# Patient Record
Sex: MALE | Race: WHITE
[De-identification: names, ages, dates, MRNs, and addresses within clinical notes are randomized per-mention and may not be internally consistent; named-entity substitution may affect disease eponyms.]

---

## 2018-01-13 ENCOUNTER — HOSPITAL ENCOUNTER (EMERGENCY)
Dept: HOSPITAL 17 - NEPE | Age: 28
Discharge: HOME | End: 2018-01-13
Payer: COMMERCIAL

## 2018-01-13 VITALS
OXYGEN SATURATION: 97 % | SYSTOLIC BLOOD PRESSURE: 138 MMHG | DIASTOLIC BLOOD PRESSURE: 87 MMHG | HEART RATE: 85 BPM | TEMPERATURE: 98.1 F | RESPIRATION RATE: 16 BRPM

## 2018-01-13 VITALS — OXYGEN SATURATION: 95 %

## 2018-01-13 DIAGNOSIS — K29.00: Primary | ICD-10-CM

## 2018-01-13 LAB
ALBUMIN SERPL-MCNC: 4.8 GM/DL (ref 3.4–5)
ALP SERPL-CCNC: 66 U/L (ref 45–117)
ALT SERPL-CCNC: 36 U/L (ref 12–78)
AST SERPL-CCNC: 23 U/L (ref 15–37)
BASOPHILS # BLD AUTO: 0.1 TH/MM3 (ref 0–0.2)
BASOPHILS NFR BLD: 0.4 % (ref 0–2)
BILIRUB SERPL-MCNC: 0.5 MG/DL (ref 0.2–1)
BUN SERPL-MCNC: 16 MG/DL (ref 7–18)
CALCIUM SERPL-MCNC: 9.5 MG/DL (ref 8.5–10.1)
CHLORIDE SERPL-SCNC: 105 MEQ/L (ref 98–107)
CREAT SERPL-MCNC: 0.99 MG/DL (ref 0.6–1.3)
EOSINOPHIL # BLD: 0.2 TH/MM3 (ref 0–0.4)
EOSINOPHIL NFR BLD: 1.4 % (ref 0–4)
ERYTHROCYTE [DISTWIDTH] IN BLOOD BY AUTOMATED COUNT: 13.1 % (ref 11.6–17.2)
GFR SERPLBLD BASED ON 1.73 SQ M-ARVRAT: 90 ML/MIN (ref 89–?)
GLUCOSE SERPL-MCNC: 84 MG/DL (ref 74–106)
HCO3 BLD-SCNC: 28.2 MEQ/L (ref 21–32)
HCT VFR BLD CALC: 48.7 % (ref 39–51)
HGB BLD-MCNC: 17.2 GM/DL (ref 13–17)
LIPASE: 74 U/L (ref 73–393)
LYMPHOCYTES # BLD AUTO: 2.4 TH/MM3 (ref 1–4.8)
LYMPHOCYTES NFR BLD AUTO: 19.1 % (ref 9–44)
MCH RBC QN AUTO: 30.4 PG (ref 27–34)
MCHC RBC AUTO-ENTMCNC: 35.3 % (ref 32–36)
MCV RBC AUTO: 86.3 FL (ref 80–100)
MONOCYTE #: 0.8 TH/MM3 (ref 0–0.9)
MONOCYTES NFR BLD: 6.5 % (ref 0–8)
NEUTROPHILS # BLD AUTO: 9.3 TH/MM3 (ref 1.8–7.7)
NEUTROPHILS NFR BLD AUTO: 72.6 % (ref 16–70)
PLATELET # BLD: 240 TH/MM3 (ref 150–450)
PMV BLD AUTO: 7.6 FL (ref 7–11)
PROT SERPL-MCNC: 8.8 GM/DL (ref 6.4–8.2)
RBC # BLD AUTO: 5.64 MIL/MM3 (ref 4.5–5.9)
SODIUM SERPL-SCNC: 140 MEQ/L (ref 136–145)
WBC # BLD AUTO: 12.8 TH/MM3 (ref 4–11)

## 2018-01-13 PROCEDURE — 84484 ASSAY OF TROPONIN QUANT: CPT

## 2018-01-13 PROCEDURE — 83690 ASSAY OF LIPASE: CPT

## 2018-01-13 PROCEDURE — 99284 EMERGENCY DEPT VISIT MOD MDM: CPT

## 2018-01-13 PROCEDURE — 85652 RBC SED RATE AUTOMATED: CPT

## 2018-01-13 PROCEDURE — 85025 COMPLETE CBC W/AUTO DIFF WBC: CPT

## 2018-01-13 PROCEDURE — 96375 TX/PRO/DX INJ NEW DRUG ADDON: CPT

## 2018-01-13 PROCEDURE — 96374 THER/PROPH/DIAG INJ IV PUSH: CPT

## 2018-01-13 PROCEDURE — 80053 COMPREHEN METABOLIC PANEL: CPT

## 2018-01-13 PROCEDURE — 74176 CT ABD & PELVIS W/O CONTRAST: CPT

## 2018-01-13 NOTE — PD
HPI


.


GI complaint


Chief Complaint:  GI Complaint


Time Seen by Provider:  04:15


Travel History


International Travel<30 days:  No


Contact w/Intl Traveler<30days:  No


Traveled to known affect area:  No





History of Present Illness


HPI


27-year-old male complains of epigastric pain radiating to the back worse with 

certain foods bending over.  Patient had this pain for the past several weeks, 

getting worse, constant tonight and much more severe.  Patient works as a 

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 

pain.  Patient denies any vomiting but has some nausea, denies any melena or 

hematochezia.  Patient denies any stool changes including stearrhea.  No change 

with oral intake fatty foods greasy foods or acidic/sour tasting foods.  Denies 

fever chills sweats denies any idiosyncratic food intake or significant travel 

history.  Patient denies any change in exercise tolerance





PFSH


Past Medical History


*** Narrative Medical


No significant past medical history


Medical History:  Denies Significant Hx





Past Surgical History


Surgical History:  No Previous Surgery





Social History


Alcohol Use:  Yes (OCC.)


Tobacco Use:  No


Substance Use:  No





Allergies-Medications


(Allergen,Severity, Reaction):  


Coded Allergies:  


     No Known Allergies (Verified  Allergy, Mild, 1/13/18)


Reported Meds & Prescriptions





Reported Meds & Active Scripts


Active


No Active Prescriptions or Reported Medications    





Narrative Medication


Allergies medications review





Review of Systems


General / Constitutional:  No: Fever


Eyes:  No: Visual changes


HENT:  No: Headaches


Cardiovascular:  No: Chest Pain or Discomfort


Respiratory:  No: Shortness of Breath


Gastrointestinal:  Positive: Nausea, Vomiting, Abdominal Pain


Genitourinary:  No: Dysuria


Musculoskeletal:  No: Pain


Skin:  No Rash


Neurologic:  No: Weakness


Psychiatric:  No: Depression


Endocrine:  No: Polydipsia


Hematologic/Lymphatic:  No: Easy Bruising





Physical Exam


Narrative


GENERAL: Awake and alert oriented 3 patient is uncomfortable appearing but 

otherwise no acute distress


SKIN: Warm and dry.  Color is normal no diaphoresis cyanosis or pallor


HEAD: Atraumatic. Normocephalic. 


EYES: Pupils equal and round. No scleral icterus. No injection or drainage. 


ENT: No nasal bleeding or discharge.  Mucous membranes pink and moist.


NECK: Trachea midline. No JVD. 


CARDIOVASCULAR: Regular rate and rhythm.  S1-S2 no murmurs rubs or gallops


RESPIRATORY: No accessory muscle use. Clear to auscultation. Breath sounds 

equal bilaterally. 


GASTROINTESTINAL: Abdomen soft, tender epigastrium no rebound or guarding no 

organomegaly appreciated., nondistended. Hepatic and splenic margins not 

palpable. 


MUSCULOSKELETAL: Extremities without clubbing, cyanosis, or edema. No obvious 

deformities. 


NEUROLOGICAL: Awake and alert. No obvious cranial nerve deficits.  Motor 

grossly within normal limits. Five out of 5 muscle strength in the arms and 

legs.  Normal speech.


PSYCHIATRIC: Appropriate mood and affect; insight and judgment normal.





Data


Data


Last Documented VS





Vital Signs








  Date Time  Temp Pulse Resp B/P (MAP) Pulse Ox O2 Delivery O2 Flow Rate FiO2


 


1/13/18 04:24     95 Room Air  


 


1/13/18 04:18 98.1 85 16 138/87 (104)    








Orders





 Orders


Complete Blood Count With Diff (1/13/18 04:15)


Comprehensive Metabolic Panel (1/13/18 04:15)


Lipase (1/13/18 04:15)


Urinalysis - C+S If Indicated (1/13/18 04:15)


Ct Abd/Pel W/O Iv Contrast (1/13/18 04:15)


Iv Access Insert/Monitor (1/13/18 04:15)


Ecg Monitoring (1/13/18 04:15)


Oximetry (1/13/18 04:15)


Ondansetron Inj (Zofran Inj) (1/13/18 04:15)


Sodium Chlor 0.9% 1000 Ml Inj (Ns 1000 M (1/13/18 04:15)


Sodium Chloride 0.9% Flush (Ns Flush) (1/13/18 04:15)


Famotidine Inj (Pepcid Inj) (1/13/18 04:15)


Al-Mag Hy-Si 40-40-4 Mg/Ml Liq (Mag-Al P (1/13/18 04:15)


Lidocaine 2% Viscous (Xylocaine 2% Visco (1/13/18 04:15)


Westergren Sedimentation Rate (1/13/18 06:06)


Troponin I (1/13/18 06:06)


Sucralfate (Carafate) (1/13/18 06:15)





Labs





Laboratory Tests








Test


  1/13/18


04:20 1/13/18


06:30


 


White Blood Count 12.8 TH/MM3  


 


Red Blood Count 5.64 MIL/MM3  


 


Hemoglobin 17.2 GM/DL  


 


Hematocrit 48.7 %  


 


Mean Corpuscular Volume 86.3 FL  


 


Mean Corpuscular Hemoglobin 30.4 PG  


 


Mean Corpuscular Hemoglobin


Concent 35.3 % 


  


 


 


Red Cell Distribution Width 13.1 %  


 


Platelet Count 240 TH/MM3  


 


Mean Platelet Volume 7.6 FL  


 


Neutrophils (%) (Auto) 72.6 %  


 


Lymphocytes (%) (Auto) 19.1 %  


 


Monocytes (%) (Auto) 6.5 %  


 


Eosinophils (%) (Auto) 1.4 %  


 


Basophils (%) (Auto) 0.4 %  


 


Neutrophils # (Auto) 9.3 TH/MM3  


 


Lymphocytes # (Auto) 2.4 TH/MM3  


 


Monocytes # (Auto) 0.8 TH/MM3  


 


Eosinophils # (Auto) 0.2 TH/MM3  


 


Basophils # (Auto) 0.1 TH/MM3  


 


CBC Comment DIFF FINAL  


 


Differential Comment   


 


Blood Urea Nitrogen 16 MG/DL  


 


Creatinine 0.99 MG/DL  


 


Random Glucose 84 MG/DL  


 


Total Protein 8.8 GM/DL  


 


Albumin 4.8 GM/DL  


 


Calcium Level 9.5 MG/DL  


 


Alkaline Phosphatase 66 U/L  


 


Aspartate Amino Transf


(AST/SGOT) 23 U/L 


  


 


 


Alanine Aminotransferase


(ALT/SGPT) 36 U/L 


  


 


 


Total Bilirubin 0.5 MG/DL  


 


Sodium Level 140 MEQ/L  


 


Potassium Level 4.1 MEQ/L  


 


Chloride Level 105 MEQ/L  


 


Carbon Dioxide Level 28.2 MEQ/L  


 


Anion Gap 7 MEQ/L  


 


Estimat Glomerular Filtration


Rate 90 ML/MIN 


  


 


 


Lipase 74 U/L  











MDM


Medical Decision Making


Medical Screen Exam Complete:  Yes


Emergency Medical Condition:  Yes


Medical Record Reviewed:  Yes


Differential Diagnosis


Abdominal pain, epigastric pain, gastritis, reflux esophagitis, duodenitis, 

pancreatitis, choledocholithiasis, biliary colic


Narrative Course


Patient's laboratory examinations reviewed, no significant abnormalities.  

Patient had mild improvement with Maalox lidocaine and antacids IV.  Patient's 

tenderness improved markedly.


CT abdomen and pelvis no intra-abdominal pathology noted as per radiology.





Diagnosis





 Primary Impression:  


 Epigastric pain


 Additional Impression:  


 Gastritis


 Qualified Codes:  K29.00 - Acute gastritis without bleeding


Patient Instructions:  Gastritis (ED), General Instructions





***Additional Instructions:  


Protonix 40 mg daily.  Carafate 1 g 4 times daily.  Recommend follow-up with 

gastroenterology for evaluation and possible upper endoscopy.  Return for 

worsening


Scripts


Tramadol (Ultram) 50 Mg Tab


50 MG PO Q8H Y for PAIN, #20 TAB 0 Refills


   Prov: Vinicio Schmid MD         1/13/18 


Sucralfate (Carafate) 1 Gram Tab


1 GM PO QID for Ulcer Prevention, #120 TAB 0 Refills


   On empty stomach


   Prov: Vinicio Schmid MD         1/13/18 


Pantoprazole (Protonix) 40 Mg Tab


40 MG PO DAILY for Reflux, #30 TAB 0 Refills


   Prov: Vinicio Schmid MD         1/13/18











Vinicio Schmid MD Jan 13, 2018 07:22

## 2018-01-13 NOTE — RADRPT
EXAM DATE/TIME:  01/13/2018 04:29 

 

[facility] COMPARISON:     

No previous studies available for comparison.

 

 

INDICATIONS :     

Left upper and mid line abdomen pain.

                  

 

ORAL CONTRAST:      

No oral contrast ingested.

                  

 

RADIATION DOSE:     

8.47 CTDIvol (mGy) 

 

 

MEDICAL HISTORY :     

None  

 

SURGICAL HISTORY :      

None. 

 

ENCOUNTER:      

Initial

 

ACUITY:      

1 day

 

PAIN SCALE:      

5/10

 

LOCATION:       

Left upper quadrant 

 

TECHNIQUE:     

Volumetric scanning of the abdomen and pelvis was performed.  Using automated exposure control and ad
justment of the mA and/or kV according to patient size, radiation dose was kept as low as reasonably 
achievable to obtain optimal diagnostic quality images.  DICOM format image data is available electro
nically for review and comparison.  The lack of IV contrast limits the diagnosis for certain organ pa
thology.

 

FINDINGS:     

 

LOWER LUNGS:     

The visualized lower lungs are clear.

 

LIVER:     

Homogeneous density without lesion.  There is no dilation of the biliary tree.  No calcified gallston
es.

 

SPLEEN:     

Normal size without lesion.

 

PANCREAS:     

Within normal limits. 

 

KIDNEYS:     

Normal in size and shape.  There is no mass, stone, or hydronephrosis.

 

ADRENAL GLANDS:     

Within normal limits.

 

VASCULAR:     

There is no aortic aneurysm.

 

BOWEL/MESENTERY:     

The stomach, small bowel, and colon demonstrate no acute abnormality.  There is no free intraperitone
al air or fluid. No inflammatory changes.

 

ABDOMINAL WALL:     

Within normal limits.

 

RETROPERITONEUM:     

There is no lymphadenopathy.

 

BLADDER:     

No wall thickening or mass.

 

REPRODUCTIVE:     

Within normal limits.

 

INGUINAL:     

There is no lymphadenopathy or hernia.

 

MUSCULOSKELETAL:     

Within normal limits for patient age.

 

CONCLUSION:     

No acute disease.  No calcified renal stones or hydronephrosis.

 

 

 

 Jem Phillips MD on January 13, 2018 at 4:48           

Board Certified Radiologist.

 This report was verified electronically.